# Patient Record
Sex: MALE | Race: WHITE | URBAN - METROPOLITAN AREA
[De-identification: names, ages, dates, MRNs, and addresses within clinical notes are randomized per-mention and may not be internally consistent; named-entity substitution may affect disease eponyms.]

---

## 2018-06-27 ENCOUNTER — OP HISTORICAL/CONVERTED ENCOUNTER (OUTPATIENT)
Dept: OTHER | Age: 83
End: 2018-06-27

## 2023-02-07 ENCOUNTER — APPOINTMENT (OUTPATIENT)
Dept: CT IMAGING | Age: 88
DRG: 069 | End: 2023-02-07
Attending: STUDENT IN AN ORGANIZED HEALTH CARE EDUCATION/TRAINING PROGRAM
Payer: MEDICARE

## 2023-02-07 ENCOUNTER — HOSPITAL ENCOUNTER (INPATIENT)
Age: 88
LOS: 2 days | Discharge: HOME | DRG: 069 | End: 2023-02-09
Attending: STUDENT IN AN ORGANIZED HEALTH CARE EDUCATION/TRAINING PROGRAM | Admitting: INTERNAL MEDICINE
Payer: MEDICARE

## 2023-02-07 DIAGNOSIS — G45.9 TIA (TRANSIENT ISCHEMIC ATTACK): ICD-10-CM

## 2023-02-07 DIAGNOSIS — R55 SYNCOPE AND COLLAPSE: Primary | ICD-10-CM

## 2023-02-07 LAB
ALBUMIN SERPL-MCNC: 3.5 G/DL (ref 3.5–5)
ALBUMIN/GLOB SERPL: 1.1 (ref 1.1–2.2)
ALP SERPL-CCNC: 167 U/L (ref 45–117)
ALT SERPL-CCNC: 43 U/L (ref 12–78)
ANION GAP SERPL CALC-SCNC: 4 MMOL/L (ref 5–15)
APTT PPP: 27.2 SEC (ref 21.2–34.1)
AST SERPL W P-5'-P-CCNC: 52 U/L (ref 15–37)
ATRIAL RATE: 59 BPM
BASOPHILS # BLD: 0 K/UL (ref 0–0.1)
BASOPHILS NFR BLD: 0 % (ref 0–1)
BILIRUB SERPL-MCNC: 0.4 MG/DL (ref 0.2–1)
BUN SERPL-MCNC: 19 MG/DL (ref 6–20)
BUN/CREAT SERPL: 20 (ref 12–20)
CA-I BLD-MCNC: 8.4 MG/DL (ref 8.5–10.1)
CALCULATED P AXIS, ECG09: 95 DEGREES
CALCULATED R AXIS, ECG10: 8 DEGREES
CALCULATED T AXIS, ECG11: 48 DEGREES
CHLORIDE SERPL-SCNC: 110 MMOL/L (ref 97–108)
CO2 SERPL-SCNC: 27 MMOL/L (ref 21–32)
CREAT SERPL-MCNC: 0.97 MG/DL (ref 0.7–1.3)
DIAGNOSIS, 93000: NORMAL
DIFFERENTIAL METHOD BLD: ABNORMAL
EOSINOPHIL # BLD: 0.1 K/UL (ref 0–0.4)
EOSINOPHIL NFR BLD: 2 % (ref 0–7)
ERYTHROCYTE [DISTWIDTH] IN BLOOD BY AUTOMATED COUNT: 13.6 % (ref 11.5–14.5)
GLOBULIN SER CALC-MCNC: 3.3 G/DL (ref 2–4)
GLUCOSE SERPL-MCNC: 135 MG/DL (ref 65–100)
HCT VFR BLD AUTO: 46.3 % (ref 36.6–50.3)
HGB BLD-MCNC: 15.2 G/DL (ref 12.1–17)
IMM GRANULOCYTES # BLD AUTO: 0.1 K/UL (ref 0–0.04)
IMM GRANULOCYTES NFR BLD AUTO: 1 % (ref 0–0.5)
INR PPP: 1.2 (ref 0.9–1.1)
LYMPHOCYTES # BLD: 1.5 K/UL (ref 0.8–3.5)
LYMPHOCYTES NFR BLD: 18 % (ref 12–49)
MCH RBC QN AUTO: 32.3 PG (ref 26–34)
MCHC RBC AUTO-ENTMCNC: 32.8 G/DL (ref 30–36.5)
MCV RBC AUTO: 98.5 FL (ref 80–99)
MONOCYTES # BLD: 1 K/UL (ref 0–1)
MONOCYTES NFR BLD: 12 % (ref 5–13)
NEUTS SEG # BLD: 5.6 K/UL (ref 1.8–8)
NEUTS SEG NFR BLD: 67 % (ref 32–75)
NRBC # BLD: 0 K/UL (ref 0–0.01)
NRBC BLD-RTO: 0 PER 100 WBC
P-R INTERVAL, ECG05: 152 MS
PLATELET # BLD AUTO: 179 K/UL (ref 150–400)
PMV BLD AUTO: 10.7 FL (ref 8.9–12.9)
POTASSIUM SERPL-SCNC: 4.3 MMOL/L (ref 3.5–5.1)
PROT SERPL-MCNC: 6.8 G/DL (ref 6.4–8.2)
PROTHROMBIN TIME: 14.9 SEC (ref 11.9–14.6)
Q-T INTERVAL, ECG07: 486 MS
QRS DURATION, ECG06: 108 MS
QTC CALCULATION (BEZET), ECG08: 481 MS
RBC # BLD AUTO: 4.7 M/UL (ref 4.1–5.7)
SODIUM SERPL-SCNC: 141 MMOL/L (ref 136–145)
THERAPEUTIC RANGE,PTTT: NORMAL SEC (ref 82–109)
TROPONIN I SERPL HS-MCNC: 7 NG/L (ref 0–76)
TROPONIN I SERPL HS-MCNC: 7 NG/L (ref 0–76)
TSH SERPL DL<=0.05 MIU/L-ACNC: 8.07 UIU/ML (ref 0.36–3.74)
VENTRICULAR RATE, ECG03: 59 BPM
WBC # BLD AUTO: 8.3 K/UL (ref 4.1–11.1)

## 2023-02-07 PROCEDURE — 70450 CT HEAD/BRAIN W/O DYE: CPT

## 2023-02-07 PROCEDURE — 84484 ASSAY OF TROPONIN QUANT: CPT

## 2023-02-07 PROCEDURE — 85730 THROMBOPLASTIN TIME PARTIAL: CPT

## 2023-02-07 PROCEDURE — 65270000029 HC RM PRIVATE

## 2023-02-07 PROCEDURE — 85025 COMPLETE CBC W/AUTO DIFF WBC: CPT

## 2023-02-07 PROCEDURE — 99285 EMERGENCY DEPT VISIT HI MDM: CPT

## 2023-02-07 PROCEDURE — 36415 COLL VENOUS BLD VENIPUNCTURE: CPT

## 2023-02-07 PROCEDURE — 93005 ELECTROCARDIOGRAM TRACING: CPT

## 2023-02-07 PROCEDURE — 84443 ASSAY THYROID STIM HORMONE: CPT

## 2023-02-07 PROCEDURE — 74011000250 HC RX REV CODE- 250: Performed by: INTERNAL MEDICINE

## 2023-02-07 PROCEDURE — 80053 COMPREHEN METABOLIC PANEL: CPT

## 2023-02-07 PROCEDURE — 94761 N-INVAS EAR/PLS OXIMETRY MLT: CPT

## 2023-02-07 PROCEDURE — 74011250637 HC RX REV CODE- 250/637: Performed by: STUDENT IN AN ORGANIZED HEALTH CARE EDUCATION/TRAINING PROGRAM

## 2023-02-07 PROCEDURE — 74011250636 HC RX REV CODE- 250/636: Performed by: INTERNAL MEDICINE

## 2023-02-07 PROCEDURE — 85610 PROTHROMBIN TIME: CPT

## 2023-02-07 RX ORDER — SODIUM CHLORIDE 0.9 % (FLUSH) 0.9 %
5-40 SYRINGE (ML) INJECTION EVERY 8 HOURS
Status: DISCONTINUED | OUTPATIENT
Start: 2023-02-07 | End: 2023-02-09 | Stop reason: HOSPADM

## 2023-02-07 RX ORDER — HEPARIN SODIUM 5000 [USP'U]/ML
5000 INJECTION, SOLUTION INTRAVENOUS; SUBCUTANEOUS EVERY 8 HOURS
Status: DISCONTINUED | OUTPATIENT
Start: 2023-02-07 | End: 2023-02-09 | Stop reason: HOSPADM

## 2023-02-07 RX ORDER — SODIUM CHLORIDE 9 MG/ML
50 INJECTION, SOLUTION INTRAVENOUS CONTINUOUS
Status: DISPENSED | OUTPATIENT
Start: 2023-02-07 | End: 2023-02-08

## 2023-02-07 RX ORDER — ASPIRIN 325 MG
325 TABLET ORAL DAILY
Status: DISCONTINUED | OUTPATIENT
Start: 2023-02-08 | End: 2023-02-09 | Stop reason: HOSPADM

## 2023-02-07 RX ORDER — DOCUSATE SODIUM 100 MG/1
100 CAPSULE, LIQUID FILLED ORAL 2 TIMES DAILY
Status: DISCONTINUED | OUTPATIENT
Start: 2023-02-07 | End: 2023-02-09 | Stop reason: HOSPADM

## 2023-02-07 RX ORDER — GUAIFENESIN 100 MG/5ML
325 LIQUID (ML) ORAL
Status: COMPLETED | OUTPATIENT
Start: 2023-02-07 | End: 2023-02-07

## 2023-02-07 RX ORDER — SODIUM CHLORIDE 0.9 % (FLUSH) 0.9 %
5-40 SYRINGE (ML) INJECTION AS NEEDED
Status: DISCONTINUED | OUTPATIENT
Start: 2023-02-07 | End: 2023-02-09 | Stop reason: HOSPADM

## 2023-02-07 RX ORDER — ACETAMINOPHEN 325 MG/1
650 TABLET ORAL
Status: DISCONTINUED | OUTPATIENT
Start: 2023-02-07 | End: 2023-02-09 | Stop reason: HOSPADM

## 2023-02-07 RX ADMIN — SODIUM CHLORIDE, PRESERVATIVE FREE 10 ML: 5 INJECTION INTRAVENOUS at 22:18

## 2023-02-07 RX ADMIN — SODIUM CHLORIDE 50 ML/HR: 9 INJECTION, SOLUTION INTRAVENOUS at 19:35

## 2023-02-07 RX ADMIN — ASPIRIN 81 MG CHEWABLE TABLET 324 MG: 81 TABLET CHEWABLE at 18:06

## 2023-02-07 NOTE — ED PROVIDER NOTES
[facility] EMERGENCY DEPT  EMERGENCY DEPARTMENT HISTORY AND PHYSICAL EXAM      Date: 2/7/2023  Patient Name: Alen Jaeger  MRN: [id number]  [place]: 9/27/1934  Date of evaluation: 2/7/2023  Provider: Jesus Neff MD   Note Started: 5:23 PM 2/7/23    HISTORY OF PRESENT ILLNESS     Chief Complaint   Patient presents with    Other       History Provided By: Patient    HPI: Alen Jaeger, 80 y.o. male presents to emergency department for apparent syncopal episode, generalized weakness and confusion last night. Today patient states that he is \"not feeling right\". Patient denies any weakness dizziness in extremities no slurred speech, no headache nausea vomiting. Patient's states that his wife was with him last night, was typing on computer and suddenly became unresponsive, was incoherently mumbling, had to be helped to bed. Patient states he woke up in bed, EMS was called at that time patient refused medical attention. Called primary care physician today and told to go to emergency department for further work-up. Patient denies any weakness currently in extremities. No visual disturbance at this time    PAST MEDICAL HISTORY   Past Medical History:  Past Medical History:   Diagnosis Date    Hypertension     Stroke [facility])        Past Surgical History:  History reviewed. No pertinent surgical history. Family History:  History reviewed. No pertinent family history. Social History:  Social History     Tobacco Use    Smoking status: Never    Smokeless tobacco: Never       Allergies: Allergies   Allergen Reactions    Codeine Unknown (comments)    Penicillins Unknown (comments)       PCP: Cherelle Resides., NP    Current Meds:   Previous Medications    No medications on file       REVIEW OF SYSTEMS   Review of Systems   Constitutional:  Negative for activity change, appetite change, chills and fever. HENT:  Negative for congestion, sore throat and trouble swallowing.     Eyes:  Negative for photophobia and visual disturbance. Respiratory:  Negative for cough, chest tightness and shortness of breath. Cardiovascular:  Negative for chest pain and palpitations. Gastrointestinal:  Negative for abdominal pain and nausea. Genitourinary:  Negative for dysuria and flank pain. Musculoskeletal:  Negative for arthralgias and neck pain. Skin:  Negative for color change and pallor. Neurological:  Positive for syncope. Negative for dizziness, weakness, numbness and headaches. Positives and Pertinent negatives as per HPI. PHYSICAL EXAM     ED Triage Vitals [02/07/23 1543]   ED Encounter Vitals Group      BP (!) 132/59      Pulse (Heart Rate) (!) 59      Resp Rate 18      Temp 97.4 °F (36.3 °C)      Temp src       O2 Sat (%) 98 %      Weight 242 lb      Height 6'      Physical Exam  Vitals and nursing note reviewed. Constitutional:       Appearance: Normal appearance. He is normal weight. HENT:      Head: Normocephalic and atraumatic. Nose: Nose normal.      Mouth/Throat:      Mouth: Mucous membranes are moist.   Eyes:      Extraocular Movements: Extraocular movements intact. Pupils: Pupils are equal, round, and reactive to light. Cardiovascular:      Rate and Rhythm: Normal rate and regular rhythm. Pulses: Normal pulses. Heart sounds: Normal heart sounds. Pulmonary:      Breath sounds: Normal breath sounds. Abdominal:      General: Abdomen is flat. Bowel sounds are normal.      Palpations: Abdomen is soft. Musculoskeletal:         General: No swelling or tenderness. Normal range of motion. Cervical back: Normal range of motion and neck supple. Skin:     General: Skin is warm and dry. Capillary Refill: Capillary refill takes less than 2 seconds. Neurological:      General: No focal deficit present. Mental Status: He is alert and oriented to person, place, and time. Cranial Nerves: No cranial nerve deficit. Sensory: No sensory deficit.       Motor: No weakness. Psychiatric:         Mood and Affect: Mood normal.         Behavior: Behavior normal.       SCREENINGS               No data recorded        LAB, EKG AND DIAGNOSTIC RESULTS   Labs:  Recent Results (from the past 12 hour(s))   CBC WITH AUTOMATED DIFF    Collection Time: 02/07/23  3:58 PM   Result Value Ref Range    WBC 8.3 4.1 - 11.1 K/uL    RBC 4.70 4. 10 - 5.70 M/uL    HGB 15.2 12.1 - 17.0 g/dL    HCT 46.3 36.6 - 50.3 %    MCV 98.5 80.0 - 99.0 FL    MCH 32.3 26.0 - 34.0 PG    MCHC 32.8 30.0 - 36.5 g/dL    RDW 13.6 11.5 - 14.5 %    PLATELET 881 285 - 676 K/uL    MPV 10.7 8.9 - 12.9 FL    NRBC 0.0 0.0  WBC    ABSOLUTE NRBC 0.00 0.00 - 0.01 K/uL    NEUTROPHILS 67 32 - 75 %    LYMPHOCYTES 18 12 - 49 %    MONOCYTES 12 5 - 13 %    EOSINOPHILS 2 0 - 7 %    BASOPHILS 0 0 - 1 %    IMMATURE GRANULOCYTES 1 (H) 0 - 0.5 %    ABS. NEUTROPHILS 5.6 1.8 - 8.0 K/UL    ABS. LYMPHOCYTES 1.5 0.8 - 3.5 K/UL    ABS. MONOCYTES 1.0 0.0 - 1.0 K/UL    ABS. EOSINOPHILS 0.1 0.0 - 0.4 K/UL    ABS. BASOPHILS 0.0 0.0 - 0.1 K/UL    ABS. IMM. GRANS. 0.1 (H) 0.00 - 0.04 K/UL    DF AUTOMATED     METABOLIC PANEL, COMPREHENSIVE    Collection Time: 02/07/23  3:58 PM   Result Value Ref Range    Sodium 141 136 - 145 mmol/L    Potassium 4.3 3.5 - 5.1 mmol/L    Chloride 110 (H) 97 - 108 mmol/L    CO2 27 21 - 32 mmol/L    Anion gap 4 (L) 5 - 15 mmol/L    Glucose 135 (H) 65 - 100 mg/dL    BUN 19 6 - 20 mg/dL    Creatinine 0.97 0.70 - 1.30 mg/dL    BUN/Creatinine ratio 20 12 - 20      eGFR >60 >60 ml/min/1.73m2    Calcium 8.4 (L) 8.5 - 10.1 mg/dL    Bilirubin, total 0.4 0.2 - 1.0 mg/dL    AST (SGOT) 52 (H) 15 - 37 U/L    ALT (SGPT) 43 12 - 78 U/L    Alk.  phosphatase 167 (H) 45 - 117 U/L    Protein, total 6.8 6.4 - 8.2 g/dL    Albumin 3.5 3.5 - 5.0 g/dL    Globulin 3.3 2.0 - 4.0 g/dL    A-G Ratio 1.1 1.1 - 2.2     PROTHROMBIN TIME + INR    Collection Time: 02/07/23  3:58 PM   Result Value Ref Range    Prothrombin time 14.9 (H) 11.9 - 14.6 sec INR 1.2 (H) 0.9 - 1.1     PTT    Collection Time: 02/07/23  3:58 PM   Result Value Ref Range    aPTT 27.2 21.2 - 34.1 sec    aPTT, therapeutic range   82 - 109 sec   TROPONIN-HIGH SENSITIVITY    Collection Time: 02/07/23  3:58 PM   Result Value Ref Range    Troponin-High Sensitivity 7 0 - 76 ng/L       EKG: Initial EKG interpreted by me. Shows sinus bradycardia 59, no ST elevations, normal axis, normal intervals,    Radiologic Studies:  Non-plain film images such as CT, Ultrasound and MRI are read by the radiologist. Plain radiographic images are visualized and preliminarily interpreted by the ED Provider with the below findings:        Interpretation per the Radiologist below, if available at the time of this note:  No results found. PROCEDURES   Unless otherwise noted below, none. Performed by: Jesus Neff MD   Procedures      CRITICAL CARE TIME       ED COURSE and DIFFERENTIAL DIAGNOSIS/MDM   Vitals:    Vitals:    02/07/23 1543 02/07/23 1556 02/07/23 1652   BP: (!) 132/59  (!) 112/56   Pulse: (!) 59  (!) 54   Resp: 18  20   Temp: 97.4 °F (36.3 °C)  98.1 °F (36.7 °C)   SpO2: 98% 100% 96%   Weight: 109.8 kg (242 lb)     Height: 6' (1.829 m)          Patient was given the following medications:  Medications   aspirin chewable tablet 324 mg (has no administration in time range)       CONSULTS: (Who and What was discussed)  None     Chronic Conditions: htn, tia  Social Determinants affecting Dx or Tx: None  Counseling:      Records Reviewed (source and summary of external notes): None    CC/HPI Summary, DDx, ED Course, and Reassessment: 59-year-old male, past medical of diabetes hypertension presents to emergency department for evaluation of questionable syncopal episode, altered mentation, gait instability this AM.    Physical exam shows well-appearing male no distress, stable vitals, neurological exam does not show any acute neurological deficits.     Differential diagnosis includes TIA, syncopal episodes, seizure-like episode, intracranial hemorrhage, electrolyte abnormality, sepsis. Given time of onset and unremarkable physical exam patient not a TNKase candidate, [facility] neurology was paged from triage. We will follow-up SOC evaluation, CT head, no indication for CTA head and neck. ED Course as of 02/07/23 1752 Tue Feb 07, 2023   1725 [facility] neurologist reviewed CT scans no signs of acute bleeds I also reviewed images I did not see any acute bleeds. [facility] neurologist, said that she did not note any acute signs of infarct. Unclear if this was a syncopal episode, possibly small infarct, recommends admission for syncope work-up, MRI of brain to rule out TIA. [PZ]   1730 Lab work resulting troponin 7, PT INR within normal limits metabolic panel shows no gross electro abnormality, CBC shows no leukocytosis, stable H&H. [PZ]   1748  Discussed with DR. Dipti Walker, will admit patient for syncope, TIA. [PZ]      ED Course User Index  [PZ] Shivam Joaquin MD       Disposition Considerations (Tests not done, Shared Decision Making, Pt Expectation of Test or Treatment.):      FINAL IMPRESSION     1. Syncope and collapse    2. TIA (transient ischemic attack)          DISPOSITION/PLAN   Admitted    Admit Note: Pt is being admitted by Dr. Dipti Walker. The results of their tests and reason(s) for their admission have been discussed with pt and/or available family. They convey agreement and understanding for the need to be admitted and for the admission diagnosis. PATIENT REFERRED TO:  Follow-up Information    None           DISCHARGE MEDICATIONS:  There are no discharge medications for this patient. DISCONTINUED MEDICATIONS:  There are no discharge medications for this patient. I am the Primary Clinician of Record: Waqas Bledsoe MD (electronically signed)    (Please note that parts of this dictation were completed with voice recognition software.  Quite often unanticipated grammatical, syntax, homophones, and other interpretive errors are inadvertently transcribed by the computer software. Please disregards these errors.  Please excuse any errors that have escaped final proofreading.)

## 2023-02-08 ENCOUNTER — APPOINTMENT (OUTPATIENT)
Dept: NON INVASIVE DIAGNOSTICS | Age: 88
DRG: 069 | End: 2023-02-08
Attending: INTERNAL MEDICINE
Payer: MEDICARE

## 2023-02-08 ENCOUNTER — APPOINTMENT (OUTPATIENT)
Dept: MRI IMAGING | Age: 88
DRG: 069 | End: 2023-02-08
Attending: PSYCHIATRY & NEUROLOGY
Payer: MEDICARE

## 2023-02-08 LAB
ALBUMIN SERPL-MCNC: 3.3 G/DL (ref 3.5–5)
ALBUMIN/GLOB SERPL: 0.9 (ref 1.1–2.2)
ALP SERPL-CCNC: 144 U/L (ref 45–117)
ALT SERPL-CCNC: 34 U/L (ref 12–78)
ANION GAP SERPL CALC-SCNC: 4 MMOL/L (ref 5–15)
AST SERPL W P-5'-P-CCNC: 39 U/L (ref 15–37)
BILIRUB SERPL-MCNC: 0.7 MG/DL (ref 0.2–1)
BUN SERPL-MCNC: 17 MG/DL (ref 6–20)
BUN/CREAT SERPL: 20 (ref 12–20)
CA-I BLD-MCNC: 8.6 MG/DL (ref 8.5–10.1)
CHLORIDE SERPL-SCNC: 111 MMOL/L (ref 97–108)
CO2 SERPL-SCNC: 26 MMOL/L (ref 21–32)
CREAT SERPL-MCNC: 0.83 MG/DL (ref 0.7–1.3)
GLOBULIN SER CALC-MCNC: 3.5 G/DL (ref 2–4)
GLUCOSE SERPL-MCNC: 108 MG/DL (ref 65–100)
POTASSIUM SERPL-SCNC: 3.8 MMOL/L (ref 3.5–5.1)
PROT SERPL-MCNC: 6.8 G/DL (ref 6.4–8.2)
SODIUM SERPL-SCNC: 141 MMOL/L (ref 136–145)
TROPONIN I SERPL HS-MCNC: 8 NG/L (ref 0–76)

## 2023-02-08 PROCEDURE — 97161 PT EVAL LOW COMPLEX 20 MIN: CPT

## 2023-02-08 PROCEDURE — 95816 EEG AWAKE AND DROWSY: CPT | Performed by: PSYCHIATRY & NEUROLOGY

## 2023-02-08 PROCEDURE — 70551 MRI BRAIN STEM W/O DYE: CPT

## 2023-02-08 PROCEDURE — 80053 COMPREHEN METABOLIC PANEL: CPT

## 2023-02-08 PROCEDURE — 74011000250 HC RX REV CODE- 250: Performed by: INTERNAL MEDICINE

## 2023-02-08 PROCEDURE — 65270000029 HC RM PRIVATE

## 2023-02-08 PROCEDURE — 74011250637 HC RX REV CODE- 250/637: Performed by: INTERNAL MEDICINE

## 2023-02-08 RX ORDER — PANTOPRAZOLE SODIUM 40 MG/1
40 TABLET, DELAYED RELEASE ORAL DAILY
COMMUNITY

## 2023-02-08 RX ORDER — GABAPENTIN 300 MG/1
300 CAPSULE ORAL 3 TIMES DAILY
COMMUNITY
End: 2023-02-09

## 2023-02-08 RX ORDER — LOVASTATIN 40 MG/1
40 TABLET ORAL
COMMUNITY

## 2023-02-08 RX ORDER — AMLODIPINE BESYLATE 10 MG/1
TABLET ORAL DAILY
COMMUNITY
End: 2023-02-09

## 2023-02-08 RX ORDER — CHOLECALCIFEROL (VITAMIN D3) 125 MCG
5 CAPSULE ORAL
Status: DISCONTINUED | OUTPATIENT
Start: 2023-02-08 | End: 2023-02-09 | Stop reason: HOSPADM

## 2023-02-08 RX ORDER — PROPRANOLOL HYDROCHLORIDE 60 MG/1
60 TABLET ORAL 3 TIMES DAILY
COMMUNITY
End: 2023-02-09

## 2023-02-08 RX ORDER — LEVOFLOXACIN 750 MG/1
750 TABLET ORAL DAILY
COMMUNITY
End: 2023-02-09

## 2023-02-08 RX ADMIN — SODIUM CHLORIDE, PRESERVATIVE FREE 10 ML: 5 INJECTION INTRAVENOUS at 05:36

## 2023-02-08 RX ADMIN — MELATONIN TAB 5 MG 5 MG: 5 TAB at 21:04

## 2023-02-08 RX ADMIN — DOCUSATE SODIUM 100 MG: 100 CAPSULE, LIQUID FILLED ORAL at 09:16

## 2023-02-08 RX ADMIN — ACETAMINOPHEN 650 MG: 325 TABLET ORAL at 12:00

## 2023-02-08 RX ADMIN — SODIUM CHLORIDE, PRESERVATIVE FREE 10 ML: 5 INJECTION INTRAVENOUS at 14:57

## 2023-02-08 RX ADMIN — ASPIRIN 325 MG: 325 TABLET ORAL at 09:16

## 2023-02-08 RX ADMIN — SODIUM CHLORIDE, PRESERVATIVE FREE 10 ML: 5 INJECTION INTRAVENOUS at 20:33

## 2023-02-08 NOTE — ACP (ADVANCE CARE PLANNING)
Advance Care Planning   Healthcare Decision Maker:       Primary Decision Maker: Luke Mann - [phone]

## 2023-02-08 NOTE — PROGRESS NOTES
Problem: Mobility Impaired (Adult and Pediatric)  Goal: *Acute Goals and Plan of Care (Insert Text)  Description: I with LE HEP x7 days  Mod I with bed mob x7 days  Mod I with all transfers x7 days  Amb 50-75ft with SBAx1 x7 days     Pt stated goal: to get home  Outcome: Not Met    PHYSICAL THERAPY EVALUATION  Patient: Nafisa Pike (1 y.o. male)  Date: 2/8/2023  Primary Diagnosis: TIA (transient ischemic attack) [G45.9]  Syncope [R55]       Precautions: In place during session:     ASSESSMENT    Pt is a 80 y.o. male admitted to hospital with ? TIA/syncope presents to PT with decreased transfers, gt, and overall functional mobility compared to his functional baseline. Pt semi supine in bed upon PT arrival, agreeable to evaluation. Pt A&O x 4. PLOF listed below. Based on the objective data described below, the patient presents with generalized weakness, impaired functional mobility, impaired amb, impaired balance, and decreased overall functional mobility. (See below for objective details and assist levels). Overall pt tolerated session good today with overall SBA/CGA with bed mob and transfers OOB. Pt was able to amb approx 8ft at EOB side to side and back and forth without LOB. Amb distance limited due to pt's IV monitor being hooked to his bed. Pt did c/o slight dizziness when sitting back down on EOB. Pt appears to function close to his baseline, but may benefit from 2-3 additional skilled PT visits to ensure safety with longer ambulation distances. Pt will benefit from continued skilled PT to address above deficits and return to PLOF. Current PT DC recommendation Home with Family Care once medically appropriate.        PLAN :  Recommendations and Planned Interventions: bed mobility training, transfer training, gait training, therapeutic exercises, patient and family training/education, and therapeutic activities      Recommend for staff: Out of bed to chair for meals, Encourage HEP in prep for ADLs/mobility, Amb to bathroom for toileting with gt belt and AD, and Amb in hallway    Frequency/Duration: Patient will be followed by physical therapy:  2-3x/week to address goals. Recommendation for discharge: (in order for the patient to meet his/her long term goals)  Home with Family Care           SUBJECTIVE:   Patient semi supine in bed upon PT arrival, agreeable to work with PT    OBJECTIVE DATA SUMMARY:   HISTORY:    Past Medical History:   Diagnosis Date    Hypertension     Stroke [facility])    History reviewed. No pertinent surgical history. Home Situation  Home Environment: Private residence  # Steps to Enter: 3  Rails to Enter: Yes  Hand Rails : Bilateral  One/Two Story Residence: Two story, live on 1st floor  Living Alone: No  Support Systems: Spouse/Significant Other  Patient Expects to be Discharged to[de-identified] Home with family assistance  Current DME Used/Available at Home: None    Personal factors and/or comorbidities impacting plan of care:     Home Situation  Home Environment: Private residence  # Steps to Enter: 3  Rails to Enter: Yes  Hand Rails : Bilateral  One/Two Story Residence: Two story, live on 1st floor  Living Alone: No  Support Systems: Spouse/Significant Other  Patient Expects to be Discharged to[de-identified] Home with family assistance  Current DME Used/Available at Home: None    EXAMINATION/PRESENTATION/DECISION MAKING:   Critical Behavior:  Neurologic State: Alert  Orientation Level: Oriented X4  Cognition: Appropriate decision making       Skin:  intact where exposed    Edema: none noted    Range Of Motion:  AROM: Within functional limits   B LE         Strength:    Strength:  Within functional limits  B LE      Tone & Sensation:     Sensation: Intact       Functional Mobility:  Bed Mobility:     Supine to Sit: Stand-by assistance  Sit to Supine: Stand-by assistance  Scooting: Stand-by assistance    Transfers:  Sit to Stand: Contact guard assistance  Stand to Sit: Contact guard assistance  Stand Pivot Transfers: Contact guard assistance                    Balance:   Sitting: Intact  Standing: Intact    Ambulation/Gait Training:  Distance (ft): 8 Feet (ft)  Assistive Device: Gait belt  Ambulation - Level of Assistance: Contact guard assistance        Functional Measure:  [employer] AM[employer]AC[employer] 6 Clicks         Basic Mobility Inpatient Short Form  How much difficulty does the patient currently have. .. Unable A Lot A Little None   1. Turning over in bed (including adjusting bedclothes, sheets and blankets)? [] 1   [] 2   [] 3   [x] 4   2. Sitting down on and standing up from a chair with arms ( e.g., wheelchair, bedside commode, etc.)   [] 1   [] 2   [x] 3   [] 4   3. Moving from lying on back to sitting on the side of the bed? [] 1   [] 2   [] 3   [x] 4          How much help from another person does the patient currently need. .. Total A Lot A Little None   4. Moving to and from a bed to a chair (including a wheelchair)? [] 1   [] 2   [x] 3   [] 4   5. Need to walk in hospital room? [] 1   [] 2   [x] 3   [] 4   6. Climbing 3-5 steps with a railing? [] 1   [] 2   [x] 3   [] 4   © 2007, Trustees of [employer], under license to [employer]. All rights reserved     Score:  Initial: 20 Most Recent: X (Date: -- )   Interpretation of Tool:  Represents activities that are increasingly more difficult (i.e. Bed mobility, Transfers, Gait).   Score 24 23 22-20 19-15 14-10 9-7 6   Modifier CH CI CJ CK CL CM CN           Physical Therapy Evaluation Charge Determination   History Examination Presentation Decision-Making   MEDIUM  Complexity : 1-2 comorbidities / personal factors will impact the outcome/ POC  MEDIUM Complexity : 3 Standardized tests and measures addressing body structure, function, activity limitation and / or participation in recreation  LOW Complexity : Stable, uncomplicated  Other Functional Measure [facility] 6 LOW      Based on the above components, the patient evaluation is determined to be of the following complexity level: LOW     Pain Rating:  No c/o pain during session    Activity Tolerance:   Good    After treatment patient left in no apparent distress:   Bed locked and in lowest position Supine in bed and Call bell within reach           COMMUNICATION/EDUCATION:   The patients plan of care was discussed with: Registered nurse.            Thank you for this referral.  Mouna Santacruz   Time Calculation: 15 mins

## 2023-02-08 NOTE — CONSULTS
NEURO CONSULT      REASON FOR ADMISSION:  \"Passing out\"      HISTORY:  Mr. Kimmy Johnson is 80years old with a history of hypertension, history of TIAs x2 on no antiplatelet agents who is consulted to neurology for having an episode in which he \"passed out\" at home while on a computer. According to him, his wife saw him slumped on the computer table and he was nonresponsive. She shook him up and eventually woke up but was not certain where or what had happened. EMS subsequently brought patient to the ED where he still is. In the ED, patient had a head CT without contrast that did not show any acute process. His symptoms have not repeated themselves. It is not clear to me for how long he was \"passed out\". He is still in the ED.     ROS: As per above, plus more    General:                     No fever, no chills, no sweats, no generalized weakness, no weight loss/gain,                                       No loss of appetite   Eyes:                           No blurred vision, no eye pain, no loss of vision, no double vision  ENT:                            rhinorrhea, no pharyngitis   Respiratory:               No cough, no sputum production, no SOB, no MALONE, no wheezing, no pleuritic pain   Cardiology:                No chest pain, no palpitations, no orthopnea, no PND, no edema, no syncope   Gastrointestinal:       No abdominal pain , no N/V, no diarrhea, no dysphagia, no constipation, no bleeding   Genitourinary:           frequency, no urgency, no dysuria, no hematuria, no incontinence   Muskuloskeletal :      No arthralgia, no myalgia, no back pain  Hematology:              No easy bruising, no nose or gum bleeding, no lymphadenopathy   Dermatological:         No rash, no ulceration, no pruritis, no color change / jaundice  Endocrine:                 hot flashes or polydipsia   Neurological:             No headache, no dizziness, no confusion, no focal weakness, no paresthesia, No Speech difficulties, no memory loss, no gait difficulty  Psychological:          No neelings of anxiety, no depression, no agitation      NEURO EXAM:    Mental status: Awake, alert, oriented to date, month, year, not aphasic. Follows commands. Cranial nerves: Cranial nerves intact    Motor exam: Motor exam intact    Sensory exam: Sensory exam intact    Coordination: Coordination intact    Gait and Station: Gait and station was not done. ASSESSMENT:  Blackout spell, \"TIA\" is less likely to cause blackout spell, though it can happen  Rule out cardiogenic syncope  Rule out vasovagal syncope  Rule out vertebrobasilar insufficiency  Doubt seizure      PLAN:  Syncope work-up  Carotid duplex  2D complete cardiac echo with color Dopplers  MRI of brain  EEG  Lipid profile  Patient should be on antiplatelet therapy which states that he has never been on. At least 81 mg of aspirin a day would be necessary. Patient seen, evaluated and treated in the ED. Total amount of time taken 38 minutes      ALLERGIES:    Allergies   Allergen Reactions    Codeine Unknown (comments)    Penicillins Unknown (comments)       MEDS:      Current Facility-Administered Medications:     sodium chloride (NS) flush 5-40 mL, 5-40 mL, IntraVENous, Q8H, Craig Martinez MD, 10 mL at 02/08/23 0536    sodium chloride (NS) flush 5-40 mL, 5-40 mL, IntraVENous, PRN, Craig Vega MD    acetaminophen (TYLENOL) tablet 650 mg, 650 mg, Oral, Q4H PRN, Craig Martinez MD    docusate sodium (COLACE) capsule 100 mg, 100 mg, Oral, BID, Craig Martinez MD    heparin (porcine) injection 5,000 Units, 5,000 Units, SubCUTAneous, Q8H, Craig Martinez MD    aspirin tablet 325 mg, 325 mg, Oral, DAILY, Craig Martinez MD  No current outpatient medications on file.     LABS:  Recent Results (from the past 24 hour(s))   CBC WITH AUTOMATED DIFF    Collection Time: 02/07/23  3:58 PM   Result Value Ref Range    WBC 8.3 4.1 - 11.1 K/uL    RBC 4.70 4.10 - 5.70 M/uL    HGB 15.2 12.1 - 17.0 g/dL    HCT 46.3 36.6 - 50.3 %    MCV 98.5 80.0 - 99.0 FL    MCH 32.3 26.0 - 34.0 PG    MCHC 32.8 30.0 - 36.5 g/dL    RDW 13.6 11.5 - 14.5 %    PLATELET 239 420 - 288 K/uL    MPV 10.7 8.9 - 12.9 FL    NRBC 0.0 0.0  WBC    ABSOLUTE NRBC 0.00 0.00 - 0.01 K/uL    NEUTROPHILS 67 32 - 75 %    LYMPHOCYTES 18 12 - 49 %    MONOCYTES 12 5 - 13 %    EOSINOPHILS 2 0 - 7 %    BASOPHILS 0 0 - 1 %    IMMATURE GRANULOCYTES 1 (H) 0 - 0.5 %    ABS. NEUTROPHILS 5.6 1.8 - 8.0 K/UL    ABS. LYMPHOCYTES 1.5 0.8 - 3.5 K/UL    ABS. MONOCYTES 1.0 0.0 - 1.0 K/UL    ABS. EOSINOPHILS 0.1 0.0 - 0.4 K/UL    ABS. BASOPHILS 0.0 0.0 - 0.1 K/UL    ABS. IMM. GRANS. 0.1 (H) 0.00 - 0.04 K/UL    DF AUTOMATED     METABOLIC PANEL, COMPREHENSIVE    Collection Time: 02/07/23  3:58 PM   Result Value Ref Range    Sodium 141 136 - 145 mmol/L    Potassium 4.3 3.5 - 5.1 mmol/L    Chloride 110 (H) 97 - 108 mmol/L    CO2 27 21 - 32 mmol/L    Anion gap 4 (L) 5 - 15 mmol/L    Glucose 135 (H) 65 - 100 mg/dL    BUN 19 6 - 20 mg/dL    Creatinine 0.97 0.70 - 1.30 mg/dL    BUN/Creatinine ratio 20 12 - 20      eGFR >60 >60 ml/min/1.73m2    Calcium 8.4 (L) 8.5 - 10.1 mg/dL    Bilirubin, total 0.4 0.2 - 1.0 mg/dL    AST (SGOT) 52 (H) 15 - 37 U/L    ALT (SGPT) 43 12 - 78 U/L    Alk.  phosphatase 167 (H) 45 - 117 U/L    Protein, total 6.8 6.4 - 8.2 g/dL    Albumin 3.5 3.5 - 5.0 g/dL    Globulin 3.3 2.0 - 4.0 g/dL    A-G Ratio 1.1 1.1 - 2.2     PROTHROMBIN TIME + INR    Collection Time: 02/07/23  3:58 PM   Result Value Ref Range    Prothrombin time 14.9 (H) 11.9 - 14.6 sec    INR 1.2 (H) 0.9 - 1.1     PTT    Collection Time: 02/07/23  3:58 PM   Result Value Ref Range    aPTT 27.2 21.2 - 34.1 sec    aPTT, therapeutic range   82 - 109 sec   TROPONIN-HIGH SENSITIVITY    Collection Time: 02/07/23  3:58 PM   Result Value Ref Range    Troponin-High Sensitivity 7 0 - 76 ng/L   EKG, 12 LEAD, INITIAL    Collection Time: 02/07/23  5:19 PM Result Value Ref Range    Ventricular Rate 59 BPM    Atrial Rate 59 BPM    P-R Interval 152 ms    QRS Duration 108 ms    Q-T Interval 486 ms    QTC Calculation (Bezet) 481 ms    Calculated P Axis 95 degrees    Calculated R Axis 8 degrees    Calculated T Axis 48 degrees    Diagnosis       Sinus bradycardia  Prolonged QT  Abnormal ECG  No previous ECGs available  Confirmed by Clifton MCBRIDE MD ([id number]) on 2/7/2023 7:12:37 PM     TSH 3RD GENERATION    Collection Time: 02/07/23 10:18 PM   Result Value Ref Range    TSH 8.07 (H) 0.36 - 3.74 uIU/mL   TROPONIN-HIGH SENSITIVITY    Collection Time: 02/07/23 10:18 PM   Result Value Ref Range    Troponin-High Sensitivity 7 0 - 76 ng/L   METABOLIC PANEL, COMPREHENSIVE    Collection Time: 02/08/23  3:51 AM   Result Value Ref Range    Sodium 141 136 - 145 mmol/L    Potassium 3.8 3.5 - 5.1 mmol/L    Chloride 111 (H) 97 - 108 mmol/L    CO2 26 21 - 32 mmol/L    Anion gap 4 (L) 5 - 15 mmol/L    Glucose 108 (H) 65 - 100 mg/dL    BUN 17 6 - 20 mg/dL    Creatinine 0.83 0.70 - 1.30 mg/dL    BUN/Creatinine ratio 20 12 - 20      eGFR >60 >60 ml/min/1.73m2    Calcium 8.6 8.5 - 10.1 mg/dL    Bilirubin, total 0.7 0.2 - 1.0 mg/dL    AST (SGOT) 39 (H) 15 - 37 U/L    ALT (SGPT) 34 12 - 78 U/L    Alk. phosphatase 144 (H) 45 - 117 U/L    Protein, total 6.8 6.4 - 8.2 g/dL    Albumin 3.3 (L) 3.5 - 5.0 g/dL    Globulin 3.5 2.0 - 4.0 g/dL    A-G Ratio 0.9 (L) 1.1 - 2.2         Visit Vitals  BP (!) 152/61 (BP 1 Location: Left upper arm, BP Patient Position: At rest;Lying)   Pulse 66   Temp 98.2 °F (36.8 °C)   Resp 16   Ht 6' (1.829 m)   Wt 109.8 kg (242 lb)   SpO2 93%   BMI 32.82 kg/m²       Imaging:  CT CODE NEURO HEAD WO CONTRAST   Final Result   1.  No acute intracranial abnormality         DUPLEX CAROTID BILATERAL    (Results Pending)   MRI BRAIN WO CONT    (Results Pending)

## 2023-02-08 NOTE — PROGRESS NOTES
Reason for Admission:  TIA                     RUR Score: 10%                    Plan for utilizing home health:   None @ this time/uses no DME. PCP: First and Last name:  Teresa Lim, NP     Name of Practice:    Are you a current patient: Yes/No: Yes   Approximate date of last visit: 2/6/23. Can you participate in a virtual visit with your PCP: Yes/Call/Has cell phone. Current Advanced Directive/Advance Care Plan: Full Code      Healthcare Decision Maker:              Primary Decision Maker: Krish Caldera - [facility] - [phone]                  Transition of Care Plan:                    D/C Plan is home & wife to transport. Send Rxs to [employer] in [place] @ [address] [place] [address] upon discharge.

## 2023-02-08 NOTE — ED NOTES
Update from PT therapist that pt reported some dizziness from standing to sitting and has a dull posterior headache. Will complete new neuro check and give tylenol prn.  Pt reports he does have hx of vertigo and it does feel similar to that

## 2023-02-08 NOTE — ED NOTES
TRANSFER - OUT REPORT:    Verbal report given to Rian Rand (name) on Labette Hockey  being transferred to (unit) for routine progression of care       Report consisted of patients Situation, Background, Assessment and   Recommendations(SBAR). Information from the following report(s) SBAR, ED Summary, MAR, and Cardiac Rhythm NSR  was reviewed with the receiving nurse. Lines:   Peripheral IV 02/07/23 Distal;Left Cephalic (Active)        Opportunity for questions and clarification was provided.       Patient transported with:   Registered Nurse

## 2023-02-08 NOTE — PROGRESS NOTES
Pt admitted to unit at 1430. Pt is A&Ox4 and VS stable. IV patent. Pt denies pain. Pt provided water and diet changed to regular via verbal order from Attending. Pt agreeable to calling for assistance to bathroom for now to avoid any potential syncopal episode. Admission questions completed by staff. Pt on telemetry monitoring, box [id number].

## 2023-02-08 NOTE — H&P
History and Physical    Patient: Kimberly Canada MRN: [id number]  SSN: [id number]    YOB: 1934  Age: 80 y.o. Sex: male      Subjective:      Kimberly Canada is a 80 y.o. male who has a history of hypertension history of TIA x2 presented with a complaint of passing out at home denies any numbness no weakness no weakness no chest pain no chills dysuria. Past Medical History:   Diagnosis Date    Hypertension     Stroke [facility])      History reviewed. No pertinent surgical history. History reviewed. No pertinent family history. Social History     Tobacco Use    Smoking status: Never    Smokeless tobacco: Never   Substance Use Topics    Alcohol use: Not on file      Prior to Admission medications    Not on File        Allergies   Allergen Reactions    Codeine Unknown (comments)    Penicillins Unknown (comments)       Review of Systems:  A comprehensive review of systems was negative except for that written in the History of Present Illness. Objective:     Vitals:    02/08/23 0356 02/08/23 0456 02/08/23 0556 02/08/23 0700   BP: (!) 148/59 (!) 148/62 108/67 (!) 152/61   Pulse: 60 61 65 66   Resp: 15 17 17 16   Temp:       SpO2: 93% 94% 96% 93%   Weight:       Height:            Physical Exam:  General:  Alert, cooperative, no distress, appears stated age. Eyes:  Conjunctivae/corneas clear. PERRL, EOMs intact. Fundi benign   Ears:  Normal TMs and external ear canals both ears. Nose: Nares normal. Septum midline. Mucosa normal. No drainage or sinus tenderness. Mouth/Throat: Lips, mucosa, and tongue normal. Teeth and gums normal.   Neck: Supple, symmetrical, trachea midline, no adenopathy, thyroid: no enlargment/tenderness/nodules, no carotid bruit and no JVD. Back:   Symmetric, no curvature. ROM normal. No CVA tenderness. Lungs:   Clear to auscultation bilaterally. Heart:  Regular rate and rhythm, S1, S2 normal, no murmur, click, rub or gallop. Abdomen:   Soft, non-tender.  Bowel sounds normal. No masses,  No organomegaly. Extremities: Extremities normal, atraumatic, no cyanosis or edema. Pulses: 2+ and symmetric all extremities. Skin: Skin color, texture, turgor normal. No rashes or lesions   Lymph nodes: Cervical, supraclavicular, and axillary nodes normal.   Neurologic: CNII-XII intact. Normal strength, sensation and reflexes throughout.        Assessment:     Hospital Problems  Never Reviewed            Codes Class Noted POA    TIA (transient ischemic attack) ICD-10-CM: G45.9  ICD-9-CM: 435.9  2/7/2023 Unknown        Syncope ICD-10-CM: R55  ICD-9-CM: 780.2  2/7/2023 Unknown       Hypertension    Plan:     Syncope work-up  Consult neurology  Obtain EEG    Signed By: Kael Posey MD     February 8, 2023

## 2023-02-09 ENCOUNTER — APPOINTMENT (OUTPATIENT)
Dept: NON INVASIVE DIAGNOSTICS | Age: 88
DRG: 069 | End: 2023-02-09
Attending: INTERNAL MEDICINE
Payer: MEDICARE

## 2023-02-09 VITALS
HEART RATE: 73 BPM | HEIGHT: 72 IN | DIASTOLIC BLOOD PRESSURE: 73 MMHG | RESPIRATION RATE: 20 BRPM | WEIGHT: 242 LBS | OXYGEN SATURATION: 95 % | SYSTOLIC BLOOD PRESSURE: 159 MMHG | BODY MASS INDEX: 32.78 KG/M2 | TEMPERATURE: 97.8 F

## 2023-02-09 LAB
ECHO AO ASC DIAM: 3.5 CM
ECHO AO ASCENDING AORTA INDEX: 1.52 CM/M2
ECHO AO ROOT DIAM: 3.5 CM
ECHO AO ROOT INDEX: 1.52 CM/M2
ECHO AV AREA PEAK VELOCITY: 1.9 CM2
ECHO AV AREA VTI: 1.9 CM2
ECHO AV AREA/BSA PEAK VELOCITY: 0.8 CM2/M2
ECHO AV AREA/BSA VTI: 0.8 CM2/M2
ECHO AV MEAN GRADIENT: 10 MMHG
ECHO AV MEAN VELOCITY: 1.4 M/S
ECHO AV PEAK GRADIENT: 22 MMHG
ECHO AV PEAK VELOCITY: 2.4 M/S
ECHO AV VELOCITY RATIO: 0.58
ECHO AV VTI: 47.6 CM
ECHO IVC EXP: 2 CM
ECHO LA AREA 2C: 25.4 CM2
ECHO LA AREA 4C: 26 CM2
ECHO LA DIAMETER INDEX: 2.21 CM/M2
ECHO LA DIAMETER: 5.1 CM
ECHO LA MAJOR AXIS: 6.2 CM
ECHO LA MINOR AXIS: 6.7 CM
ECHO LA TO AORTIC ROOT RATIO: 1.46
ECHO LA VOL BP: 86 ML (ref 18–58)
ECHO LA VOL/BSA BIPLANE: 37 ML/M2 (ref 16–34)
ECHO LV E' LATERAL VELOCITY: 8 CM/S
ECHO LV E' SEPTAL VELOCITY: 8 CM/S
ECHO LV EDV A2C: 91 ML
ECHO LV EDV A4C: 115 ML
ECHO LV EDV INDEX A4C: 50 ML/M2
ECHO LV EDV NDEX A2C: 39 ML/M2
ECHO LV EJECTION FRACTION A2C: 71 %
ECHO LV EJECTION FRACTION A4C: 63 %
ECHO LV EJECTION FRACTION BIPLANE: 66 % (ref 55–100)
ECHO LV ESV A2C: 26 ML
ECHO LV ESV A4C: 42 ML
ECHO LV ESV INDEX A2C: 11 ML/M2
ECHO LV ESV INDEX A4C: 18 ML/M2
ECHO LV FRACTIONAL SHORTENING: 30 % (ref 28–44)
ECHO LV INTERNAL DIMENSION DIASTOLE INDEX: 1.73 CM/M2
ECHO LV INTERNAL DIMENSION DIASTOLIC: 4 CM (ref 4.2–5.9)
ECHO LV INTERNAL DIMENSION SYSTOLIC INDEX: 1.21 CM/M2
ECHO LV INTERNAL DIMENSION SYSTOLIC: 2.8 CM
ECHO LV IVSD: 1.5 CM (ref 0.6–1)
ECHO LV MASS 2D: 232.7 G (ref 88–224)
ECHO LV MASS INDEX 2D: 100.7 G/M2 (ref 49–115)
ECHO LV POSTERIOR WALL DIASTOLIC: 1.5 CM (ref 0.6–1)
ECHO LV RELATIVE WALL THICKNESS RATIO: 0.75
ECHO LVOT AREA: 3.1 CM2
ECHO LVOT AV VTI INDEX: 0.62
ECHO LVOT DIAM: 2 CM
ECHO LVOT MEAN GRADIENT: 4 MMHG
ECHO LVOT PEAK GRADIENT: 8 MMHG
ECHO LVOT PEAK VELOCITY: 1.4 M/S
ECHO LVOT STROKE VOLUME INDEX: 40 ML/M2
ECHO LVOT SV: 92.3 ML
ECHO LVOT VTI: 29.4 CM
ECHO MV A VELOCITY: 0.89 M/S
ECHO MV E DECELERATION TIME (DT): 272 MS
ECHO MV E VELOCITY: 0.71 M/S
ECHO MV E/A RATIO: 0.8
ECHO MV E/E' LATERAL: 8.88
ECHO MV E/E' RATIO (AVERAGED): 8.88
ECHO MV E/E' SEPTAL: 8.88
ECHO PV MAX VELOCITY: 1.1 M/S
ECHO PV PEAK GRADIENT: 5 MMHG
ECHO RA AREA 4C: 17.6 CM2
ECHO RA END SYSTOLIC VOLUME APICAL 4 CHAMBER INDEX BSA: 20 ML/M2
ECHO RA VOLUME: 47 ML
ECHO RV BASAL DIMENSION: 3.7 CM
ECHO RV TAPSE: 2.6 CM (ref 1.7–?)
LEFT CCA DIST DIAS: 10.3 CM/S
LEFT CCA DIST SYS: 71.8 CM/S
LEFT CCA PROX DIAS: 11 CM/S
LEFT CCA PROX SYS: 112 CM/S
LEFT ECA DIAS: 0 CM/S
LEFT ECA SYS: 134 CM/S
LEFT ICA DIST DIAS: 22.8 CM/S
LEFT ICA DIST SYS: 94.2 CM/S
LEFT ICA PROX DIAS: 16.4 CM/S
LEFT ICA PROX SYS: 73.7 CM/S
LEFT ICA/CCA SYS: 1.03
LEFT VERTEBRAL DIAS: 7.75 CM/S
LEFT VERTEBRAL SYS: 40.6 CM/S
RIGHT CCA DIST DIAS: 2.9 CM/S
RIGHT CCA DIST SYS: 73.8 CM/S
RIGHT CCA PROX DIAS: 2.2 CM/S
RIGHT CCA PROX SYS: 91.4 CM/S
RIGHT ECA DIAS: 9.75 CM/S
RIGHT ECA SYS: 131 CM/S
RIGHT ICA DIST DIAS: 9.6 CM/S
RIGHT ICA DIST SYS: 47.5 CM/S
RIGHT ICA PROX DIAS: 17.5 CM/S
RIGHT ICA PROX SYS: 87 CM/S
RIGHT ICA/CCA SYS: 1.18
RIGHT VERTEBRAL DIAS: 7.15 CM/S
RIGHT VERTEBRAL SYS: 44.5 CM/S
VAS LEFT SUBCLAVIAN PROX EDV: 0 CM/S
VAS LEFT SUBCLAVIAN PROX PSV: 100 CM/S
VAS RIGHT SUBCLAVIAN PROX EDV: 0 CM/S
VAS RIGHT SUBCLAVIAN PROX PSV: 133 CM/S

## 2023-02-09 PROCEDURE — 93880 EXTRACRANIAL BILAT STUDY: CPT

## 2023-02-09 PROCEDURE — 93306 TTE W/DOPPLER COMPLETE: CPT

## 2023-02-09 PROCEDURE — 74011250637 HC RX REV CODE- 250/637: Performed by: INTERNAL MEDICINE

## 2023-02-09 PROCEDURE — 74011000250 HC RX REV CODE- 250: Performed by: INTERNAL MEDICINE

## 2023-02-09 RX ORDER — LEVOTHYROXINE SODIUM 25 UG/1
25 TABLET ORAL
Status: DISCONTINUED | OUTPATIENT
Start: 2023-02-09 | End: 2023-02-09 | Stop reason: HOSPADM

## 2023-02-09 RX ORDER — AMLODIPINE BESYLATE 5 MG/1
10 TABLET ORAL DAILY
Status: DISCONTINUED | OUTPATIENT
Start: 2023-02-09 | End: 2023-02-09 | Stop reason: HOSPADM

## 2023-02-09 RX ORDER — CHOLECALCIFEROL (VITAMIN D3) 125 MCG
5 CAPSULE ORAL
Qty: 30 TABLET | Refills: 0 | Status: SHIPPED | OUTPATIENT
Start: 2023-02-09

## 2023-02-09 RX ORDER — LEVOTHYROXINE SODIUM 25 UG/1
25 TABLET ORAL
Qty: 30 TABLET | Refills: 0 | Status: SHIPPED | OUTPATIENT
Start: 2023-02-09

## 2023-02-09 RX ORDER — DOCUSATE SODIUM 100 MG/1
100 CAPSULE, LIQUID FILLED ORAL 2 TIMES DAILY
Qty: 60 CAPSULE | Refills: 2 | Status: SHIPPED | OUTPATIENT
Start: 2023-02-09 | End: 2023-05-10

## 2023-02-09 RX ORDER — AMLODIPINE BESYLATE 10 MG/1
10 TABLET ORAL DAILY
Qty: 30 TABLET | Refills: 0 | Status: SHIPPED | OUTPATIENT
Start: 2023-02-09

## 2023-02-09 RX ORDER — ASPIRIN 325 MG
325 TABLET ORAL DAILY
Qty: 30 TABLET | Refills: 0 | Status: SHIPPED | OUTPATIENT
Start: 2023-02-10

## 2023-02-09 RX ADMIN — AMLODIPINE BESYLATE 10 MG: 5 TABLET ORAL at 12:29

## 2023-02-09 RX ADMIN — ASPIRIN 325 MG: 325 TABLET ORAL at 08:36

## 2023-02-09 RX ADMIN — DOCUSATE SODIUM 100 MG: 100 CAPSULE, LIQUID FILLED ORAL at 08:36

## 2023-02-09 RX ADMIN — SODIUM CHLORIDE, PRESERVATIVE FREE 10 ML: 5 INJECTION INTRAVENOUS at 02:37

## 2023-02-09 NOTE — DISCHARGE SUMMARY
Discharge Summary     Patient: Forest Castanon MRN: [id number]  SSN: [id number]    YOB: 1934  Age: 80 y.o. Sex: male       Admit Date: 2/7/2023    Discharge Date: 2/9/2023      Admission Diagnoses: TIA (transient ischemic attack) [G45.9]  Syncope [R55]    Discharge Diagnoses:   Problem List as of 2/9/2023 Never Reviewed            Codes Class Noted - Resolved    TIA (transient ischemic attack) ICD-10-CM: G45.9  ICD-9-CM: 435.9  2/7/2023 - Present        Syncope ICD-10-CM: R55  ICD-9-CM: 780.2  2/7/2023 - Present            Discharge Condition: Good    Hospital Course: 80years old on multiple medications for high blood pressure had a syncopal episode at home thyroid test shows elevated TSH greater than 8 MRI of the brain is negative and by neurologist patient is going for echocardiogram and EEG again advised to follow-up with his family doctor if the studies are normal  Past meds were made to his medications with discontinuation of antihypertensive medicines  Consults: Neurology    Significant Diagnostic Studies: Neurologylabs:     MRI BRAIN WO CONT   Final Result      1. No acute intracranial abnormality. 2. Mild chronic microvascular ischemic disease. CT CODE NEURO HEAD WO CONTRAST   Final Result   1. No acute intracranial abnormality         DUPLEX CAROTID BILATERAL    (Results Pending)       Disposition: home    Discharge Medications:   Current Discharge Medication List        START taking these medications    Details   aspirin (ASPIRIN) 325 mg tablet Take 1 Tablet by mouth daily. Qty: 30 Tablet, Refills: 0      docusate sodium (COLACE) 100 mg capsule Take 1 Capsule by mouth two (2) times a day for 90 days. Qty: 60 Capsule, Refills: 2      levothyroxine (SYNTHROID) 25 mcg tablet Take 1 Tablet by mouth every morning. Qty: 30 Tablet, Refills: 0      melatonin 5 mg tablet Take 1 Tablet by mouth nightly.   Qty: 30 Tablet, Refills: 0           CONTINUE these medications which have CHANGED    Details   amLODIPine (NORVASC) 10 mg tablet Take 1 Tablet by mouth daily. Qty: 30 Tablet, Refills: 0           CONTINUE these medications which have NOT CHANGED    Details   pantoprazole (PROTONIX) 40 mg tablet Take 40 mg by mouth daily. lovastatin (MEVACOR) 40 mg tablet Take 40 mg by mouth nightly.            STOP taking these medications       gabapentin (NEURONTIN) 300 mg capsule Comments:   Reason for Stopping:         levoFLOXacin (LEVAQUIN) 750 mg tablet Comments:   Reason for Stopping:         propranoloL (INDERAL) 60 mg tablet Comments:   Reason for Stopping:               Activity: Activity as tolerated  Diet: Cardiac Diet  Wound Care: None needed    Follow-up Appointments   Procedures    FOLLOW UP VISIT Appointment in: 3 - 5 Days     Standing Status:   Standing     Number of Occurrences:   1     Order Specific Question:   Appointment in     Answer:   3 - 5 Days   30 minutes discharge time    Signed By: India Hernandez MD     February 9, 2023

## 2023-02-09 NOTE — PROGRESS NOTES
CM noted discharge order. Primary physician has cleared patient at this time. Patient to follow up outpatient with neurology. No CM needs. Primary RN made aware. Discharge plan of care/case management plan validated with provider discharge order.

## 2023-02-09 NOTE — PROGRESS NOTES
NEURO PROGRESS NOTE        SUBJECTIVE:   Syncopal episode      EXAM:  Awake, alert, oriented, not aphasic  No syncopal episode reported  MRI of the brain negative for stroke      ASSESSMENT/PLAN:  Patient being discharged  Follow-up with Romelia in 2 weeks    ALLERGIES:    Allergies   Allergen Reactions    Codeine Unknown (comments)    Penicillins Unknown (comments)       MEDS:      Current Facility-Administered Medications:     levothyroxine (SYNTHROID) tablet 25 mcg, 25 mcg, Oral, 6am, Craig Martinez MD    amLODIPine (NORVASC) tablet 10 mg, 10 mg, Oral, DAILY, Craig Martinez MD    melatonin tablet 5 mg, 5 mg, Oral, QHS, Craig Martinez MD, 5 mg at 02/08/23 2104    sodium chloride (NS) flush 5-40 mL, 5-40 mL, IntraVENous, Q8H, Craig Martinez MD, 10 mL at 02/09/23 0237    sodium chloride (NS) flush 5-40 mL, 5-40 mL, IntraVENous, PRN, Craig Ray Asa, MD, 10 mL at 02/08/23 1457    acetaminophen (TYLENOL) tablet 650 mg, 650 mg, Oral, Q4H PRN, Cleone Cogan I, MD, 650 mg at 02/08/23 1200    docusate sodium (COLACE) capsule 100 mg, 100 mg, Oral, BID, Craig Martinez MD, 100 mg at 02/09/23 0836    heparin (porcine) injection 5,000 Units, 5,000 Units, SubCUTAneous, Q8H, Craig Martinez MD    aspirin tablet 325 mg, 325 mg, Oral, DAILY, Craig Martinez MD, 325 mg at 02/09/23 0836    LABS:  No results found for this or any previous visit (from the past 24 hour(s)). Visit Vitals  BP (!) 153/72   Pulse 72   Temp 98.7 °F (37.1 °C)   Resp 20   Ht 6' (1.829 m)   Wt 109.8 kg (242 lb)   SpO2 95%   BMI 32.82 kg/m²       Imaging:  MRI BRAIN WO CONT   Final Result      1. No acute intracranial abnormality. 2. Mild chronic microvascular ischemic disease. CT CODE NEURO HEAD WO CONTRAST   Final Result   1.  No acute intracranial abnormality         DUPLEX CAROTID BILATERAL    (Results Pending)

## 2023-02-10 NOTE — PROCEDURES
[facility]  EEG    Name:  Pamella Garnett  MR#:  [id number]  :  1934  ACCOUNT #:  [de-identified]  DATE OF SERVICE:  2023    DIAGNOSIS:  Syncope. DESCRIPTION:  Recording is done digitally on a computer. Electrodes are placed in a 10/20 international system. Initial recording is equipment calibration followed by biocalibration. Initial montages are bipolar montage. TECHNIQUE:  Tracings started with the patient awake, eyes closed with well-formed bioccipital alpha rhythms in the 9 Hz frequency. As the recording continues, the patient is hooked up to an EKG machine that shows a heart rate of 90. Tracings continued with the patient having intermittent muscle artifact. He is exposed to photic stimulation without any abnormal changes. Hyperventilation is not done. IMPRESSION:  This is a normal EEG, awake.       MD JOSEPHINE [email or web address]  D:  02/10/2023 11:56  T:  02/10/2023 14:47  JOB #:  [id number]

## 2024-07-29 NOTE — ED TRIAGE NOTES
-- DO NOT REPLY / DO NOT REPLY ALL --  -- This inbox is not monitored  -- Message is from Engagement Center Operations (ECO) --    General Patient Message: patient received a letter stating that her provider is no longer apart of [employer] medicare advantage plan as of 06/01/2024. She is asking if the Dr could call [phone] and say that she will keep patient in continuous of care so she will be able to keep her as her PCP     Caller Information         Type Contact Phone/Fax    07/29/2024 08:30 AM CDT Phone (Incoming) Elissa Delacruz (Self) [phone] (M)            Alternative phone number: n/a    Can a detailed message be left? Yes - Voicemail      Patient has been advised the message will be addressed within 2-3 business days.             Patient had an episode around 200 yesterday, wife describes patient being altered for approximately 2 hrs, patient says he has been off balance and had a headache since episode.